# Patient Record
(demographics unavailable — no encounter records)

---

## 2025-06-13 NOTE — HISTORY OF PRESENT ILLNESS
[FreeTextEntry1] : Establish primary care  [de-identified] : Pt is a 79 yo M with complex PMHx of recent CAD s/p PCI (LAD x1, 5/2025 ), mild aortic stenosis, T2DM, HTN, R ureter carcinoma s/p R nephrourectomy,  opioid dependence on Suboxone, COPD, current smoker(half a pack a day),  renal cell carcinoma (s/p right nephrectomy in ~2021), ureteral cancer (in remission, last treatment about 6 months ago), spontaneous pneumothorax (at the age of 20 years old, resolved), HCV infection (resolved) is here to establish primary care. Pt reported that since the stent placement in May patient has not seen any providers including cardiology, patient is not aware of what meds he is been taking. Pt lives alone, independent in his ADLs except driving. Pt pays his neighbor to get him to the appointments.   In addition, pt reported that he went to the ED for hypertension. Noted to have acute onset left leg cramp sensation that woke him up from sleep. Reported BP of 190/99 and called EMS, in the ED BP was in the 160/80, discharged w/ follow up w/ PCP. Per patient, he is compliant with medications. Denies fever, chills, nausea, vomiting, chest pain, SOB, hematuria, dysuria, changes in vision. No recent fall or trauma.  During the visit, Pt's BP was 122/68, patient is asymptomatic. Reported he took his meds this morning, reported that his metoprolol was recently decreased from 75 mg to 25 mg daily which could be the reason for the recent increase for his BP

## 2025-06-13 NOTE — HISTORY OF PRESENT ILLNESS
[FreeTextEntry1] : Establish primary care  [de-identified] : Pt is a 79 yo M with complex PMHx of recent CAD s/p PCI (LAD x1, 5/2025 ), mild aortic stenosis, T2DM, HTN, R ureter carcinoma s/p R nephrourectomy,  opioid dependence on Suboxone, COPD, current smoker(half a pack a day),  renal cell carcinoma (s/p right nephrectomy in ~2021), ureteral cancer (in remission, last treatment about 6 months ago), spontaneous pneumothorax (at the age of 20 years old, resolved), HCV infection (resolved) is here to establish primary care. Pt reported that since the stent placement in May patient has not seen any providers including cardiology, patient is not aware of what meds he is been taking. Pt lives alone, independent in his ADLs except driving. Pt pays his neighbor to get him to the appointments.   In addition, pt reported that he went to the ED for hypertension. Noted to have acute onset left leg cramp sensation that woke him up from sleep. Reported BP of 190/99 and called EMS, in the ED BP was in the 160/80, discharged w/ follow up w/ PCP. Per patient, he is compliant with medications. Denies fever, chills, nausea, vomiting, chest pain, SOB, hematuria, dysuria, changes in vision. No recent fall or trauma.  During the visit, Pt's BP was 122/68, patient is asymptomatic. Reported he took his meds this morning, reported that his metoprolol was recently decreased from 75 mg to 25 mg daily which could be the reason for the recent increase for his BP

## 2025-06-13 NOTE — CURRENT MEDS
[Lack of understanding] : lack of understanding [Yes] : Reviewed medication list for presence of high-risk medications. [Opioids] : opioids [Blood Thinners] : blood thinners [Takes medication as prescribed] : does not take [FreeTextEntry1] : Pt is on suboxone

## 2025-06-13 NOTE — PHYSICAL EXAM
[No Acute Distress] : no acute distress [Normal] : no joint swelling and grossly normal strength and tone [de-identified] : ambulates with a cane  [de-identified] : multiple skin lesions on the face and the lower extremities

## 2025-06-13 NOTE — COUNSELING
[Inadequate social support] : Inadequate social support [Patient Non-adherent to care plan] : Patient non-adherent to care plan [Transportation Issues] : Transportation issues [Financial issues] : Financial issues [Needs reinforcement, referred] : Patient needs reinforcement on understanding of lifestyle changes and steps needed to achieve self management goal; patient was referred [Fall prevention counseling provided] : Fall prevention counseling provided [Adequate lighting] : Adequate lighting [No throw rugs] : No throw rugs [Use proper foot wear] : Use proper foot wear [Use recommended devices] : Use recommended devices [de-identified] : will refer to

## 2025-06-13 NOTE — PHYSICAL EXAM
[No Acute Distress] : no acute distress [Normal] : no joint swelling and grossly normal strength and tone [de-identified] : ambulates with a cane  [de-identified] : multiple skin lesions on the face and the lower extremities

## 2025-06-13 NOTE — ASSESSMENT
[Vaccines Reviewed] : Immunizations reviewed today. Please see immunization details in the vaccine log within the immunization flowsheet.  [FreeTextEntry1] : Pt is a 77 yo M with complex PMHx of recent CAD s/p PCI (LAD x1, 5/2025 ), mild aortic stenosis, T2DM, HTN, R ureter carcinoma s/p R nephrourectomy, opioid dependence on Suboxone, COPD, current smoker(half a pack a day), renal cell carcinoma (s/p right nephrectomy in ~2021), ureteral cancer (in remission, last treatment about 6 months ago), spontaneous pneumothorax (at the age of 20 years old, resolved), HCV infection (resolved) is here to establish primary care  # CAD  s/p PCI (LAD X1 in 05/25) c/ w/ DAPT, per patient he was only taking aspirin as he did not receive plavix from the pharmacy  Pt was counseled on stent restenosis if not adherent in taking DAPT  Increased metoprolol succinate to 50 mg daily  Start atorvastatin 40 mg daily, patient was taking atorvastatin 10 mg, unsure why the dosage (could be because of the myalgia), will start with 40 mg and titrate it  Follow up w/ cardiology (helped patient make an appointment on June 27th)  # 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 Pieter Phipps   # Insomnia  Pt is currently taking Seroquel 200 mg qhs, plan was to decrease the dose but patient refused, he stated " I need it to be increased and not decreased as I am having so much trouble sleeping at night. My psychiatrist is the one who adjusts my meds and I need a refill until I see him" Pt reported that he understands the risks of QTc prolongation/arrhythmias/ CV events and that he wants to continue to take them   Seroquel 200 mg refilled  # COPD  # Current smoker  Smokes half a pack a day, pt stated that he used to smoke 1 and a half a pack/day daily  Pt is not on any inhalers  Pt counseled to quit smoking Pt declined any help, stated that he would like to try on his own  # Left for testicular cancer  s/p chemotherapy and radiation in 1978 S/P orchiectomy Follow up w/ urology   # malignant neoplasm of right ureter: s/p Right Laparoscopic Nephroureterectomy on 03/21/18 Pt has had couple of sessions for intravesical BCG at the urology office  Follow up with urology   Strict RTC discussed with the patient if there's worsening symptoms or feeling unwell in general   RTC for a PCP visit in 3 mos or as needed

## 2025-06-13 NOTE — HEALTH RISK ASSESSMENT
[Good] : ~his/her~  mood as  good [No] : In the past 12 months have you used drugs other than those required for medical reasons? No [No falls in past year] : Patient reported no falls in the past year [0] : 2) Feeling down, depressed, or hopeless: Not at all (0) [PHQ-2 Negative - No further assessment needed] : PHQ-2 Negative - No further assessment needed [HIV test declined] : HIV test declined [None] : None [Alone] : lives alone [Retired] : retired [Feels Safe at Home] : Feels safe at home [Fully functional (bathing, dressing, toileting, transferring, walking, feeding)] : Fully functional (bathing, dressing, toileting, transferring, walking, feeding) [Fully functional (using the telephone, shopping, preparing meals, housekeeping, doing laundry, using] : Fully functional and needs no help or supervision to perform IADLs (using the telephone, shopping, preparing meals, housekeeping, doing laundry, using transportation, managing medications and managing finances) [Smoke Detector] : smoke detector [Carbon Monoxide Detector] : carbon monoxide detector [Safety elements used in home] : safety elements used in home [Seat Belt] :  uses seat belt [Sunscreen] : uses sunscreen [Current] : Current [< 15 Years] : < 15 Years [NO] : No [Fair] :  ~his/her~ mood as fair [de-identified] : Pt has a Hx of opioid use disorder, now on suboxone  [de-identified] : walking  [de-identified] : Tries to eat healthy diet [[id number]] : 0 [Reports changes in dental health] : Reports no changes in dental health [HepatitisCComments] : Pt has a Hx of Hep C (resolved) [de-identified] : Pt currently smokes half a pack a day

## 2025-06-13 NOTE — HEALTH RISK ASSESSMENT
[Good] : ~his/her~  mood as  good [No] : No [No falls in past year] : Patient reported no falls in the past year [0] : 2) Feeling down, depressed, or hopeless: Not at all (0) [PHQ-2 Negative - No further assessment needed] : PHQ-2 Negative - No further assessment needed [HIV test declined] : HIV test declined [Alone] : lives alone [None] : None [Retired] : retired [Fully functional (bathing, dressing, toileting, transferring, walking, feeding)] : Fully functional (bathing, dressing, toileting, transferring, walking, feeding) [Feels Safe at Home] : Feels safe at home [Fully functional (using the telephone, shopping, preparing meals, housekeeping, doing laundry, using] : Fully functional and needs no help or supervision to perform IADLs (using the telephone, shopping, preparing meals, housekeeping, doing laundry, using transportation, managing medications and managing finances) [Smoke Detector] : smoke detector [Carbon Monoxide Detector] : carbon monoxide detector [Seat Belt] :  uses seat belt [Safety elements used in home] : safety elements used in home [Sunscreen] : uses sunscreen [Current] : Current [< 15 Years] : < 15 Years [NO] : No [Fair] :  ~his/her~ mood as fair [de-identified] : Pt has a Hx of opioid use disorder, now on suboxone  [de-identified] : walking  [de-identified] : Tries to eat healthy diet [[id number]] : 0 [Reports changes in dental health] : Reports no changes in dental health [HepatitisCComments] : Pt has a Hx of Hep C (resolved) [de-identified] : Pt currently smokes half a pack a day

## 2025-06-13 NOTE — COUNSELING
[Inadequate social support] : Inadequate social support [Patient Non-adherent to care plan] : Patient non-adherent to care plan [Transportation Issues] : Transportation issues [Financial issues] : Financial issues [Needs reinforcement, referred] : Patient needs reinforcement on understanding of lifestyle changes and steps needed to achieve self management goal; patient was referred [Fall prevention counseling provided] : Fall prevention counseling provided [Adequate lighting] : Adequate lighting [No throw rugs] : No throw rugs [Use proper foot wear] : Use proper foot wear [Use recommended devices] : Use recommended devices [de-identified] : will refer to

## 2025-06-13 NOTE — COUNSELING
[Inadequate social support] : Inadequate social support [Patient Non-adherent to care plan] : Patient non-adherent to care plan [Transportation Issues] : Transportation issues [Financial issues] : Financial issues [Needs reinforcement, referred] : Patient needs reinforcement on understanding of lifestyle changes and steps needed to achieve self management goal; patient was referred [Fall prevention counseling provided] : Fall prevention counseling provided [Adequate lighting] : Adequate lighting [No throw rugs] : No throw rugs [Use proper foot wear] : Use proper foot wear [Use recommended devices] : Use recommended devices [de-identified] : will refer to

## 2025-06-13 NOTE — PHYSICAL EXAM
[No Acute Distress] : no acute distress [Normal] : no joint swelling and grossly normal strength and tone [de-identified] : ambulates with a cane  [de-identified] : multiple skin lesions on the face and the lower extremities

## 2025-06-13 NOTE — HISTORY OF PRESENT ILLNESS
[FreeTextEntry1] : Establish primary care  [de-identified] : Pt is a 79 yo M with complex PMHx of recent CAD s/p PCI (LAD x1, 5/2025 ), mild aortic stenosis, T2DM, HTN, R ureter carcinoma s/p R nephrourectomy,  opioid dependence on Suboxone, COPD, current smoker(half a pack a day),  renal cell carcinoma (s/p right nephrectomy in ~2021), ureteral cancer (in remission, last treatment about 6 months ago), spontaneous pneumothorax (at the age of 20 years old, resolved), HCV infection (resolved) is here to establish primary care. Pt reported that since the stent placement in May patient has not seen any providers including cardiology, patient is not aware of what meds he is been taking. Pt lives alone, independent in his ADLs except driving. Pt pays his neighbor to get him to the appointments.   In addition, pt reported that he went to the ED for hypertension. Noted to have acute onset left leg cramp sensation that woke him up from sleep. Reported BP of 190/99 and called EMS, in the ED BP was in the 160/80, discharged w/ follow up w/ PCP. Per patient, he is compliant with medications. Denies fever, chills, nausea, vomiting, chest pain, SOB, hematuria, dysuria, changes in vision. No recent fall or trauma.  During the visit, Pt's BP was 122/68, patient is asymptomatic. Reported he took his meds this morning, reported that his metoprolol was recently decreased from 75 mg to 25 mg daily which could be the reason for the recent increase for his BP

## 2025-06-13 NOTE — ASSESSMENT
[Vaccines Reviewed] : Immunizations reviewed today. Please see immunization details in the vaccine log within the immunization flowsheet.  [FreeTextEntry1] : Pt is a 79 yo M with complex PMHx of recent CAD s/p PCI (LAD x1, 5/2025 ), mild aortic stenosis, T2DM, HTN, R ureter carcinoma s/p R nephrourectomy, opioid dependence on Suboxone, COPD, current smoker(half a pack a day), renal cell carcinoma (s/p right nephrectomy in ~2021), ureteral cancer (in remission, last treatment about 6 months ago), spontaneous pneumothorax (at the age of 20 years old, resolved), HCV infection (resolved) is here to establish primary care  # CAD  s/p PCI (LAD X1 in 05/25) c/ w/ DAPT, per patient he was only taking aspirin as he did not receive plavix from the pharmacy  Pt was counseled on stent restenosis if not adherent in taking DAPT  Increased metoprolol succinate to 50 mg daily  Start atorvastatin 40 mg daily, patient was taking atorvastatin 10 mg, unsure why the dosage (could be because of the myalgia), will start with 40 mg and titrate it  Follow up w/ cardiology (helped patient make an appointment on June 27th)  # 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 Pieter Phipps   # Insomnia  Pt is currently taking Seroquel 200 mg qhs, plan was to decrease the dose but patient refused, he stated " I need it to be increased and not decreased as I am having so much trouble sleeping at night. My psychiatrist is the one who adjusts my meds and I need a refill until I see him" Pt reported that he understands the risks of QTc prolongation/arrhythmias/ CV events and that he wants to continue to take them   Seroquel 200 mg refilled  # COPD  # Current smoker  Smokes half a pack a day, pt stated that he used to smoke 1 and a half a pack/day daily  Pt is not on any inhalers  Pt counseled to quit smoking Pt declined any help, stated that he would like to try on his own  # Left for testicular cancer  s/p chemotherapy and radiation in 1978 S/P orchiectomy Follow up w/ urology   # malignant neoplasm of right ureter: s/p Right Laparoscopic Nephroureterectomy on 03/21/18 Pt has had couple of sessions for intravesical BCG at the urology office  Follow up with urology   Strict RTC discussed with the patient if there's worsening symptoms or feeling unwell in general   RTC for a PCP visit in 3 mos or as needed

## 2025-06-13 NOTE — END OF VISIT
[FreeTextEntry3] :  75 minutes of total time was spent on the date of the encounter. This included time for review of medical records and laboratory results, face to face time (including the physical examination counseling and coordination of care), time for patient education, treatment plans, answering questions, communicating with other doctors and for medical record documentation.  The time spent is separate from time spent on separately billed procedures.

## 2025-06-13 NOTE — HEALTH RISK ASSESSMENT
[Good] : ~his/her~  mood as  good [No] : No [No falls in past year] : Patient reported no falls in the past year [0] : 2) Feeling down, depressed, or hopeless: Not at all (0) [PHQ-2 Negative - No further assessment needed] : PHQ-2 Negative - No further assessment needed [HIV test declined] : HIV test declined [None] : None [Alone] : lives alone [Retired] : retired [Feels Safe at Home] : Feels safe at home [Fully functional (bathing, dressing, toileting, transferring, walking, feeding)] : Fully functional (bathing, dressing, toileting, transferring, walking, feeding) [Fully functional (using the telephone, shopping, preparing meals, housekeeping, doing laundry, using] : Fully functional and needs no help or supervision to perform IADLs (using the telephone, shopping, preparing meals, housekeeping, doing laundry, using transportation, managing medications and managing finances) [Smoke Detector] : smoke detector [Carbon Monoxide Detector] : carbon monoxide detector [Seat Belt] :  uses seat belt [Safety elements used in home] : safety elements used in home [Sunscreen] : uses sunscreen [Current] : Current [< 15 Years] : < 15 Years [NO] : No [Fair] :  ~his/her~ mood as fair [de-identified] : Pt has a Hx of opioid use disorder, now on suboxone  [de-identified] : walking  [de-identified] : Tries to eat healthy diet [[id number]] : 0 [Reports changes in dental health] : Reports no changes in dental health [HepatitisCComments] : Pt has a Hx of Hep C (resolved) [de-identified] : Pt currently smokes half a pack a day

## 2025-07-23 NOTE — ASSESSMENT
[FreeTextEntry1] : Pt is a 79 yo M with complex PMHx of recent CAD s/p PCI (LAD x1, 5/2025 ), mild aortic stenosis, T2DM, HTN, R ureter carcinoma s/p R nephrourectomy, opioid dependence on Suboxone, COPD, current smoker(half a pack a day), renal cell carcinoma (s/p right nephrectomy in ~2021), ureteral cancer (in remission, last treatment about 6 months ago), spontaneous pneumothorax (at the age of 20 years old, resolved), HCV infection (resolved) is here to establish primary care  # CAD  s/p PCI (LAD X1 in 05/25) c/ w/ DAPT, per patient he was only taking aspirin as he did not receive plavix from the pharmacy  Pt was counseled on stent restenosis if not adherent in taking DAPT  Increased metoprolol succinate to 50 mg daily  Start atorvastatin 40 mg daily, patient was taking atorvastatin 10 mg, unsure why the dosage (could be because of the myalgia), will start with 40 mg and titrate it  Follow up w/ cardiology (helped patient make an appointment on June 27th)  # 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 Pieter Phipps   # Insomnia  Pt is currently taking Seroquel 200 mg qhs, plan was to decrease the dose but patient refused, he stated " I need it to be increased and not decreased as I am having so much trouble sleeping at night. My psychiatrist is the one who adjusts my meds and I need a refill until I see him" Pt reported that he understands the risks of QTc prolongation/arrhythmias/ CV events and that he wants to continue to take them   Seroquel 200 mg refilled  # COPD  # Current smoker  Smokes half a pack a day, pt stated that he used to smoke 1 and a half a pack/day daily  Pt is not on any inhalers  Pt counseled to quit smoking Pt declined any help, stated that he would like to try on his own  # Left for testicular cancer  s/p chemotherapy and radiation in 1978 S/P orchiectomy Follow up w/ urology   # malignant neoplasm of right ureter: s/p Right Laparoscopic Nephroureterectomy on 03/21/18 Pt has had couple of sessions for intravesical BCG at the urology office  Follow up with urology   Strict RTC discussed with the patient if there's worsening symptoms or feeling unwell in general   RTC for a PCP visit in 3 mos or as needed

## 2025-07-23 NOTE — ASSESSMENT
[FreeTextEntry1] : Pt is a 77 yo M with complex PMHx of recent CAD s/p PCI (LAD x1, 5/2025 ), mild aortic stenosis, T2DM, HTN, R ureter carcinoma s/p R nephrourectomy, opioid dependence on Suboxone, COPD, current smoker(half a pack a day), renal cell carcinoma (s/p right nephrectomy in ~2021), ureteral cancer (in remission, last treatment about 6 months ago), spontaneous pneumothorax (at the age of 20 years old, resolved), HCV infection (resolved) is here to establish primary care  # CAD  s/p PCI (LAD X1 in 05/25) c/ w/ DAPT, per patient he was only taking aspirin as he did not receive plavix from the pharmacy  Pt was counseled on stent restenosis if not adherent in taking DAPT  Increased metoprolol succinate to 50 mg daily  Start atorvastatin 40 mg daily, patient was taking atorvastatin 10 mg, unsure why the dosage (could be because of the myalgia), will start with 40 mg and titrate it  Follow up w/ cardiology (helped patient make an appointment on June 27th)  # 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 Pieter Phipps   # Insomnia  Pt is currently taking Seroquel 200 mg qhs, plan was to decrease the dose but patient refused, he stated " I need it to be increased and not decreased as I am having so much trouble sleeping at night. My psychiatrist is the one who adjusts my meds and I need a refill until I see him" Pt reported that he understands the risks of QTc prolongation/arrhythmias/ CV events and that he wants to continue to take them   Seroquel 200 mg refilled  # COPD  # Current smoker  Smokes half a pack a day, pt stated that he used to smoke 1 and a half a pack/day daily  Pt is not on any inhalers  Pt counseled to quit smoking Pt declined any help, stated that he would like to try on his own  # Left for testicular cancer  s/p chemotherapy and radiation in 1978 S/P orchiectomy Follow up w/ urology   # malignant neoplasm of right ureter: s/p Right Laparoscopic Nephroureterectomy on 03/21/18 Pt has had couple of sessions for intravesical BCG at the urology office  Follow up with urology   Strict RTC discussed with the patient if there's worsening symptoms or feeling unwell in general   RTC for a PCP visit in 3 mos or as needed

## 2025-07-23 NOTE — HISTORY OF PRESENT ILLNESS
[de-identified] : Pt is a 77 yo M with complex PMHx of recent CAD s/p PCI (LAD x1, 5/2025 ), mild aortic stenosis, T2DM, HTN, R ureter carcinoma s/p R nephrourectomy,  opioid dependence on Suboxone, COPD, current smoker(half a pack a day),  renal cell carcinoma (s/p right nephrectomy in ~2021), ureteral cancer (in remission, last treatment about 6 months ago), spontaneous pneumothorax (at the age of 20 years old, resolved), HCV infection (resolved) is here to establish primary care. Pt reported that since the stent placement in May patient has not seen any providers including cardiology, patient is not aware of what meds he is been taking. Pt lives alone, independent in his ADLs except driving. Pt pays his neighbor to get him to the appointments.   In addition, pt reported that he went to the ED for hypertension. Noted to have acute onset left leg cramp sensation that woke him up from sleep. Reported BP of 190/99 and called EMS, in the ED BP was in the 160/80, discharged w/ follow up w/ PCP. Per patient, he is compliant with medications. Denies fever, chills, nausea, vomiting, chest pain, SOB, hematuria, dysuria, changes in vision. No recent fall or trauma.  During the visit, Pt's BP was 122/68, patient is asymptomatic. Reported he took his meds this morning, reported that his metoprolol was recently decreased from 75 mg to 25 mg daily which could be the reason for the recent increase for his BP

## 2025-07-23 NOTE — HISTORY OF PRESENT ILLNESS
[de-identified] : Pt is a 79 yo M with complex PMHx of recent CAD s/p PCI (LAD x1, 5/2025 ), mild aortic stenosis, T2DM, HTN, R ureter carcinoma s/p R nephrourectomy,  opioid dependence on Suboxone, COPD, current smoker(half a pack a day),  renal cell carcinoma (s/p right nephrectomy in ~2021), ureteral cancer (in remission, last treatment about 6 months ago), spontaneous pneumothorax (at the age of 20 years old, resolved), HCV infection (resolved) is here to establish primary care. Pt reported that since the stent placement in May patient has not seen any providers including cardiology, patient is not aware of what meds he is been taking. Pt lives alone, independent in his ADLs except driving. Pt pays his neighbor to get him to the appointments.   In addition, pt reported that he went to the ED for hypertension. Noted to have acute onset left leg cramp sensation that woke him up from sleep. Reported BP of 190/99 and called EMS, in the ED BP was in the 160/80, discharged w/ follow up w/ PCP. Per patient, he is compliant with medications. Denies fever, chills, nausea, vomiting, chest pain, SOB, hematuria, dysuria, changes in vision. No recent fall or trauma.  During the visit, Pt's BP was 122/68, patient is asymptomatic. Reported he took his meds this morning, reported that his metoprolol was recently decreased from 75 mg to 25 mg daily which could be the reason for the recent increase for his BP